# Patient Record
Sex: FEMALE | Race: WHITE
[De-identification: names, ages, dates, MRNs, and addresses within clinical notes are randomized per-mention and may not be internally consistent; named-entity substitution may affect disease eponyms.]

---

## 2017-04-14 ENCOUNTER — HOSPITAL ENCOUNTER (OUTPATIENT)
Dept: HOSPITAL 61 - PCVCCLINIC | Age: 72
Discharge: HOME | End: 2017-04-14
Attending: INTERNAL MEDICINE
Payer: MEDICARE

## 2017-04-14 DIAGNOSIS — I10: ICD-10-CM

## 2017-04-14 DIAGNOSIS — Z95.1: ICD-10-CM

## 2017-04-14 DIAGNOSIS — E78.5: ICD-10-CM

## 2017-04-14 DIAGNOSIS — I25.10: Primary | ICD-10-CM

## 2017-04-14 PROCEDURE — 80061 LIPID PANEL: CPT

## 2017-04-14 PROCEDURE — G0463 HOSPITAL OUTPT CLINIC VISIT: HCPCS

## 2017-04-14 PROCEDURE — 93005 ELECTROCARDIOGRAM TRACING: CPT

## 2017-10-06 ENCOUNTER — HOSPITAL ENCOUNTER (OUTPATIENT)
Dept: HOSPITAL 61 - PCVCCLINIC | Age: 72
Discharge: HOME | End: 2017-10-06
Attending: INTERNAL MEDICINE
Payer: MEDICARE

## 2017-10-06 DIAGNOSIS — I25.10: Primary | ICD-10-CM

## 2017-10-06 DIAGNOSIS — E78.5: ICD-10-CM

## 2017-10-06 DIAGNOSIS — Z79.899: ICD-10-CM

## 2017-10-06 DIAGNOSIS — I10: ICD-10-CM

## 2017-10-06 DIAGNOSIS — I65.23: ICD-10-CM

## 2017-10-06 PROCEDURE — 93005 ELECTROCARDIOGRAM TRACING: CPT

## 2017-10-06 PROCEDURE — 80061 LIPID PANEL: CPT

## 2017-10-06 PROCEDURE — G0463 HOSPITAL OUTPT CLINIC VISIT: HCPCS

## 2018-02-13 ENCOUNTER — HOSPITAL ENCOUNTER (OUTPATIENT)
Dept: HOSPITAL 35 - RAD | Age: 73
End: 2018-02-13
Attending: NURSE PRACTITIONER
Payer: COMMERCIAL

## 2018-02-13 DIAGNOSIS — E28.39: ICD-10-CM

## 2018-02-13 DIAGNOSIS — M85.88: ICD-10-CM

## 2018-02-13 DIAGNOSIS — Z78.0: ICD-10-CM

## 2018-02-13 DIAGNOSIS — Z12.31: Primary | ICD-10-CM

## 2018-04-10 ENCOUNTER — HOSPITAL ENCOUNTER (OUTPATIENT)
Dept: HOSPITAL 61 - PCVCCLINIC | Age: 73
Discharge: HOME | End: 2018-04-10
Attending: INTERNAL MEDICINE
Payer: MEDICARE

## 2018-04-10 DIAGNOSIS — I25.10: Primary | ICD-10-CM

## 2018-04-10 DIAGNOSIS — I10: ICD-10-CM

## 2018-04-10 DIAGNOSIS — I65.23: ICD-10-CM

## 2018-04-10 DIAGNOSIS — E78.5: ICD-10-CM

## 2018-04-10 DIAGNOSIS — Z79.899: ICD-10-CM

## 2018-04-10 DIAGNOSIS — Z79.82: ICD-10-CM

## 2018-04-10 PROCEDURE — 93005 ELECTROCARDIOGRAM TRACING: CPT

## 2018-10-31 ENCOUNTER — HOSPITAL ENCOUNTER (OUTPATIENT)
Dept: HOSPITAL 61 - PCVCIMAG | Age: 73
Discharge: HOME | End: 2018-10-31
Attending: INTERNAL MEDICINE
Payer: MEDICARE

## 2018-10-31 ENCOUNTER — HOSPITAL ENCOUNTER (INPATIENT)
Dept: HOSPITAL 35 - ER | Age: 73
LOS: 2 days | Discharge: HOME | DRG: 246 | End: 2018-11-02
Attending: INTERNAL MEDICINE | Admitting: INTERNAL MEDICINE
Payer: COMMERCIAL

## 2018-10-31 VITALS — BODY MASS INDEX: 24.75 KG/M2 | WEIGHT: 154 LBS | HEIGHT: 66 IN

## 2018-10-31 VITALS — DIASTOLIC BLOOD PRESSURE: 77 MMHG | SYSTOLIC BLOOD PRESSURE: 114 MMHG

## 2018-10-31 VITALS — SYSTOLIC BLOOD PRESSURE: 111 MMHG | DIASTOLIC BLOOD PRESSURE: 73 MMHG

## 2018-10-31 VITALS — SYSTOLIC BLOOD PRESSURE: 114 MMHG | DIASTOLIC BLOOD PRESSURE: 72 MMHG

## 2018-10-31 VITALS — SYSTOLIC BLOOD PRESSURE: 119 MMHG | DIASTOLIC BLOOD PRESSURE: 77 MMHG

## 2018-10-31 VITALS — SYSTOLIC BLOOD PRESSURE: 101 MMHG | DIASTOLIC BLOOD PRESSURE: 72 MMHG

## 2018-10-31 VITALS — SYSTOLIC BLOOD PRESSURE: 112 MMHG | DIASTOLIC BLOOD PRESSURE: 69 MMHG

## 2018-10-31 VITALS — DIASTOLIC BLOOD PRESSURE: 69 MMHG | SYSTOLIC BLOOD PRESSURE: 112 MMHG

## 2018-10-31 VITALS — DIASTOLIC BLOOD PRESSURE: 81 MMHG | SYSTOLIC BLOOD PRESSURE: 113 MMHG

## 2018-10-31 VITALS — DIASTOLIC BLOOD PRESSURE: 72 MMHG | SYSTOLIC BLOOD PRESSURE: 101 MMHG

## 2018-10-31 DIAGNOSIS — I25.10: ICD-10-CM

## 2018-10-31 DIAGNOSIS — I10: Primary | ICD-10-CM

## 2018-10-31 DIAGNOSIS — I65.29: ICD-10-CM

## 2018-10-31 DIAGNOSIS — Z82.49: ICD-10-CM

## 2018-10-31 DIAGNOSIS — Z90.710: ICD-10-CM

## 2018-10-31 DIAGNOSIS — Z95.1: ICD-10-CM

## 2018-10-31 DIAGNOSIS — Z88.2: ICD-10-CM

## 2018-10-31 DIAGNOSIS — I21.09: Primary | ICD-10-CM

## 2018-10-31 DIAGNOSIS — I25.5: ICD-10-CM

## 2018-10-31 DIAGNOSIS — I47.2: ICD-10-CM

## 2018-10-31 DIAGNOSIS — I11.0: ICD-10-CM

## 2018-10-31 DIAGNOSIS — E78.5: ICD-10-CM

## 2018-10-31 DIAGNOSIS — Z88.6: ICD-10-CM

## 2018-10-31 DIAGNOSIS — I50.21: ICD-10-CM

## 2018-10-31 DIAGNOSIS — Z88.8: ICD-10-CM

## 2018-10-31 LAB
ANION GAP SERPL CALC-SCNC: 14 MMOL/L (ref 7–16)
BUN SERPL-MCNC: 17 MG/DL (ref 7–18)
CALCIUM SERPL-MCNC: 9.6 MG/DL (ref 8.5–10.1)
CHLORIDE SERPL-SCNC: 108 MMOL/L (ref 98–107)
CO2 SERPL-SCNC: 19 MMOL/L (ref 21–32)
CREAT SERPL-MCNC: 0.8 MG/DL (ref 0.6–1)
ERYTHROCYTE [DISTWIDTH] IN BLOOD BY AUTOMATED COUNT: 13.3 % (ref 10.5–14.5)
GLUCOSE SERPL-MCNC: 119 MG/DL (ref 74–106)
HCT VFR BLD CALC: 38.8 % (ref 37–47)
HGB BLD-MCNC: 13.6 GM/DL (ref 12–15)
INR PPP: 1.1
MCH RBC QN AUTO: 30.3 PG (ref 26–34)
MCHC RBC AUTO-ENTMCNC: 35 G/DL (ref 28–37)
MCV RBC: 86.7 FL (ref 80–100)
PLATELET # BLD: 182 THOU/UL (ref 150–400)
POTASSIUM SERPL-SCNC: 3.9 MMOL/L (ref 3.5–5.1)
PROTHROMBIN TIME: 11.1 SECONDS (ref 9.3–11.4)
RBC # BLD AUTO: 4.47 MIL/UL (ref 4.2–5)
SODIUM SERPL-SCNC: 141 MMOL/L (ref 136–145)
TROPONIN I SERPL-MCNC: 1.03 NG/ML (ref ?–0.06)
WBC # BLD AUTO: 5.3 THOU/UL (ref 4–11)

## 2018-10-31 PROCEDURE — B215YZZ FLUOROSCOPY OF LEFT HEART USING OTHER CONTRAST: ICD-10-PCS | Performed by: INTERNAL MEDICINE

## 2018-10-31 PROCEDURE — B218YZZ FLUOROSCOPY OF LEFT INTERNAL MAMMARY BYPASS GRAFT USING OTHER CONTRAST: ICD-10-PCS | Performed by: INTERNAL MEDICINE

## 2018-10-31 PROCEDURE — 4A023N7 MEASUREMENT OF CARDIAC SAMPLING AND PRESSURE, LEFT HEART, PERCUTANEOUS APPROACH: ICD-10-PCS | Performed by: INTERNAL MEDICINE

## 2018-10-31 PROCEDURE — 93325 DOPPLER ECHO COLOR FLOW MAPG: CPT

## 2018-10-31 PROCEDURE — 027034Z DILATION OF CORONARY ARTERY, ONE ARTERY WITH DRUG-ELUTING INTRALUMINAL DEVICE, PERCUTANEOUS APPROACH: ICD-10-PCS | Performed by: INTERNAL MEDICINE

## 2018-10-31 PROCEDURE — 93351 STRESS TTE COMPLETE: CPT

## 2018-10-31 PROCEDURE — B211YZZ FLUOROSCOPY OF MULTIPLE CORONARY ARTERIES USING OTHER CONTRAST: ICD-10-PCS | Performed by: INTERNAL MEDICINE

## 2018-10-31 NOTE — PCVCIMAG
--------------- APPROVED REPORT --------------





Study performed:  10/31/2018 10:27:10



Exam:  Stress Echocardiogram

Indication: CAD s/p CABG,stent

Patient Location: Echo lab

Stress Nurse: Yuliya Leal RN

Room #: 2

Status: routine



Ht: 5 ft 6 in  

HR: 76 bpm      BP: 146/92 mmHg

Rhythm: Incomplete RBBB



Medical History

Medical History: CAD s/p CABG,stent, PAD

Cardiac Risk Factors: HTN, Hyperlipidemia

Previous Cardiac Procedures: CABG,PCI

Exercise History: Physically active



Procedure

The patient underwent an Exercise Stress Test using the Pantera 

Protocol. Blood pressure, heart rate, and EKG were monitored.

An Echocardiogram was performed by technician in four stages in quad 

fashion.  At peak stress, four selected images were obtained and 

placed side by side with resting images for comparison.



Stress Test Details

Stress Test:  Exercise stress testing was performed using a Pantera 

protocol.

HR

Resting HR:            76 bpmMax Heart Rate (APMHR): 149 bpm 

Max HR Achieved:  151 bpmTarget HR (85% APMHR): 126 bpm

% of APMHR:         101

Recovery HR:            105 bpm

HR response to stress: Normal HR response to stress



BP

Resting BP:  146/92 mmHg

Max BP:       182/86 mmHg

Recovery BP:       110/70 mmHg



ECG

Resting ECG:  Incomplete RBBB

Stress ECG:     Anterolateral injury pattern

ST Change: Strongly positive

Maximum ST Deviation: 4 mm

Arrhythmia:    Frequent PVCs,couplets ,short runs PVcs

Recovery ECG: Anterolateral MI pattern, Anteroseptal MI 

pattern

Recovery ST Change: Strongly positive

Recovery ST Deviation: 4.5 mm

Recovery Arrhythmia: Non-sustained ventricular tachycardia, 

Ventricular fibrillation



Clinical

Reason for Termination: Maximal effort

Stress Symptoms: Leg Fatigue

Exercise duration: 8 min 01 sec

Highest Stage Achieved: Stage 3: 3.4 mph at 14% grade. 

Exercise capacity: 10.1 METs

Overall Exercise Capacity for Age: Good

Scale: Active

Angina Score: None



Stress ECG Conclusion

The patient exercised according to the PANTERA protocol for 8:01 mins; 

achieving a work level of 10.1  METS. 

The resting heart rate of 78  bpm gildardo to a maximum heart rate of 157 

bpm. 

This value represent 105% of the maximal, age-predicted heart rate. 

The resting blood pressure of  146/92mmHg, gildardo to a maximum blood 

pressure of 182/86mmHg. 

The exercise test was stopped due to fatigue.

ST elevation which began as the exercise was stopped continued to 

progress.  Dr Jha was called to the room. Sublingual 

nitroglycerin x 2 was given. An IV was started.

Alfredo was called and patient was transported to the hospital.

[facility] Treadmill Score is -12.0 which is High risk.



Pre-Stress Echo

The resting Echocardiogram showed normal left ventricular 

contractility with an estimated Ejection Fraction of about 55-60%. 



Post-Stress Echo

The stress Echocardiogram showed abnormal left ventricular 

contractility with an estimated Ejection Fraction of about 45-50%. 

The stress Echocardiogram demonstrated wall motion abnormality in the 

basal anteroseptal, mid-anteroseptal, apical anterior 

walls.



Conclusion

Clinical Response:  Ischemic

Exercise Capacity:  Average

Stress ECG Response:  Ischemic

Stress Echo Images:  Ischemic

Positive stress test with acute changes to the EKG and images.



Other Information

Study Quality: Adequate



Critical Notification

Physician Notified 



&lt;Conclusion&gt;

Positive stress test with acute changes to the EKG and images.

## 2018-11-01 VITALS — SYSTOLIC BLOOD PRESSURE: 101 MMHG | DIASTOLIC BLOOD PRESSURE: 67 MMHG

## 2018-11-01 VITALS — DIASTOLIC BLOOD PRESSURE: 57 MMHG | SYSTOLIC BLOOD PRESSURE: 91 MMHG

## 2018-11-01 VITALS — SYSTOLIC BLOOD PRESSURE: 91 MMHG | DIASTOLIC BLOOD PRESSURE: 58 MMHG

## 2018-11-01 VITALS — DIASTOLIC BLOOD PRESSURE: 61 MMHG | SYSTOLIC BLOOD PRESSURE: 100 MMHG

## 2018-11-01 VITALS — DIASTOLIC BLOOD PRESSURE: 66 MMHG | SYSTOLIC BLOOD PRESSURE: 98 MMHG

## 2018-11-01 LAB
ALBUMIN SERPL-MCNC: 3.1 G/DL (ref 3.4–5)
ALT SERPL-CCNC: 24 U/L (ref 30–65)
ANION GAP SERPL CALC-SCNC: 12 MMOL/L (ref 7–16)
AST SERPL-CCNC: 50 U/L (ref 15–37)
BILIRUB SERPL-MCNC: 0.5 MG/DL
BUN SERPL-MCNC: 13 MG/DL (ref 7–18)
CALCIUM SERPL-MCNC: 9.1 MG/DL (ref 8.5–10.1)
CHLORIDE SERPL-SCNC: 109 MMOL/L (ref 98–107)
CHOLEST SERPL-MCNC: 151 MG/DL (ref ?–200)
CO2 SERPL-SCNC: 21 MMOL/L (ref 21–32)
CREAT SERPL-MCNC: 0.8 MG/DL (ref 0.6–1)
ERYTHROCYTE [DISTWIDTH] IN BLOOD BY AUTOMATED COUNT: 13.7 % (ref 10.5–14.5)
GLUCOSE SERPL-MCNC: 117 MG/DL (ref 74–106)
HCT VFR BLD CALC: 31.3 % (ref 37–47)
HDLC SERPL-MCNC: 45 MG/DL (ref 40–?)
HGB BLD-MCNC: 11.1 GM/DL (ref 12–15)
LDLC SERPL-MCNC: 82 MG/DL (ref ?–100)
MCH RBC QN AUTO: 30.8 PG (ref 26–34)
MCHC RBC AUTO-ENTMCNC: 35.4 G/DL (ref 28–37)
MCV RBC: 87 FL (ref 80–100)
PLATELET # BLD: 175 THOU/UL (ref 150–400)
POTASSIUM SERPL-SCNC: 3.7 MMOL/L (ref 3.5–5.1)
PROT SERPL-MCNC: 5.7 G/DL (ref 6.4–8.2)
RBC # BLD AUTO: 3.6 MIL/UL (ref 4.2–5)
SODIUM SERPL-SCNC: 142 MMOL/L (ref 136–145)
TC:HDL: 3.4 RATIO
TRIGL SERPL-MCNC: 121 MG/DL (ref ?–150)
TROPONIN I SERPL-MCNC: 10.81 NG/ML (ref ?–0.06)
VLDLC SERPL CALC-MCNC: 24 MG/DL (ref ?–40)
WBC # BLD AUTO: 10.3 THOU/UL (ref 4–11)

## 2018-11-02 VITALS — DIASTOLIC BLOOD PRESSURE: 60 MMHG | SYSTOLIC BLOOD PRESSURE: 98 MMHG

## 2018-11-02 VITALS — SYSTOLIC BLOOD PRESSURE: 98 MMHG | DIASTOLIC BLOOD PRESSURE: 60 MMHG

## 2018-11-09 ENCOUNTER — HOSPITAL ENCOUNTER (OUTPATIENT)
Dept: HOSPITAL 61 - PCVCCLINIC | Age: 73
Discharge: HOME | End: 2018-11-09
Attending: INTERNAL MEDICINE
Payer: MEDICARE

## 2018-11-09 DIAGNOSIS — I25.10: Primary | ICD-10-CM

## 2018-11-09 DIAGNOSIS — I10: ICD-10-CM

## 2018-11-09 DIAGNOSIS — Z95.1: ICD-10-CM

## 2018-11-09 DIAGNOSIS — Z79.82: ICD-10-CM

## 2018-11-09 DIAGNOSIS — E78.5: ICD-10-CM

## 2018-11-09 DIAGNOSIS — I25.5: ICD-10-CM

## 2018-11-09 DIAGNOSIS — I65.23: ICD-10-CM

## 2018-11-09 PROCEDURE — G0463 HOSPITAL OUTPT CLINIC VISIT: HCPCS

## 2018-11-09 PROCEDURE — 93005 ELECTROCARDIOGRAM TRACING: CPT

## 2018-11-29 ENCOUNTER — HOSPITAL ENCOUNTER (EMERGENCY)
Dept: HOSPITAL 35 - ER | Age: 73
Discharge: HOME | End: 2018-11-29
Payer: COMMERCIAL

## 2018-11-29 VITALS — HEIGHT: 66 IN | WEIGHT: 150 LBS | BODY MASS INDEX: 24.11 KG/M2

## 2018-11-29 VITALS — DIASTOLIC BLOOD PRESSURE: 63 MMHG | SYSTOLIC BLOOD PRESSURE: 121 MMHG

## 2018-11-29 DIAGNOSIS — Z95.5: ICD-10-CM

## 2018-11-29 DIAGNOSIS — Z88.5: ICD-10-CM

## 2018-11-29 DIAGNOSIS — Z90.710: ICD-10-CM

## 2018-11-29 DIAGNOSIS — Z88.2: ICD-10-CM

## 2018-11-29 DIAGNOSIS — Z85.3: ICD-10-CM

## 2018-11-29 DIAGNOSIS — Z90.12: ICD-10-CM

## 2018-11-29 DIAGNOSIS — Z88.1: ICD-10-CM

## 2018-11-29 DIAGNOSIS — I10: ICD-10-CM

## 2018-11-29 DIAGNOSIS — R07.89: Primary | ICD-10-CM

## 2018-11-29 LAB
ANION GAP SERPL CALC-SCNC: 10 MMOL/L (ref 7–16)
BASOPHILS NFR BLD AUTO: 0.6 % (ref 0–2)
BUN SERPL-MCNC: 13 MG/DL (ref 7–18)
CALCIUM SERPL-MCNC: 10.2 MG/DL (ref 8.5–10.1)
CHLORIDE SERPL-SCNC: 102 MMOL/L (ref 98–107)
CO2 SERPL-SCNC: 26 MMOL/L (ref 21–32)
CREAT SERPL-MCNC: 0.9 MG/DL (ref 0.6–1)
EOSINOPHIL NFR BLD: 3.2 % (ref 0–3)
ERYTHROCYTE [DISTWIDTH] IN BLOOD BY AUTOMATED COUNT: 14.6 % (ref 10.5–14.5)
GLUCOSE SERPL-MCNC: 108 MG/DL (ref 74–106)
GRANULOCYTES NFR BLD MANUAL: 67.9 % (ref 36–66)
HCT VFR BLD CALC: 40.6 % (ref 37–47)
HGB BLD-MCNC: 13.6 GM/DL (ref 12–15)
LYMPHOCYTES NFR BLD AUTO: 20.9 % (ref 24–44)
MCH RBC QN AUTO: 29.7 PG (ref 26–34)
MCHC RBC AUTO-ENTMCNC: 33.4 G/DL (ref 28–37)
MCV RBC: 89.1 FL (ref 80–100)
MONOCYTES NFR BLD: 7.4 % (ref 1–8)
NEUTROPHILS # BLD: 4.4 THOU/UL (ref 1.4–8.2)
PLATELET # BLD: 163 THOU/UL (ref 150–400)
POTASSIUM SERPL-SCNC: 3.5 MMOL/L (ref 3.5–5.1)
RBC # BLD AUTO: 4.56 MIL/UL (ref 4.2–5)
SODIUM SERPL-SCNC: 138 MMOL/L (ref 136–145)
TROPONIN I SERPL-MCNC: <0.06 NG/ML (ref ?–0.06)
WBC # BLD AUTO: 6.4 THOU/UL (ref 4–11)

## 2018-12-21 ENCOUNTER — HOSPITAL ENCOUNTER (OUTPATIENT)
Dept: HOSPITAL 61 - PCVCCLINIC | Age: 73
Discharge: HOME | End: 2018-12-21
Attending: INTERNAL MEDICINE
Payer: MEDICARE

## 2018-12-21 DIAGNOSIS — I25.5: ICD-10-CM

## 2018-12-21 DIAGNOSIS — I10: ICD-10-CM

## 2018-12-21 DIAGNOSIS — I25.10: Primary | ICD-10-CM

## 2018-12-21 DIAGNOSIS — Z79.899: ICD-10-CM

## 2018-12-21 DIAGNOSIS — Z79.82: ICD-10-CM

## 2018-12-21 DIAGNOSIS — Z95.1: ICD-10-CM

## 2018-12-21 DIAGNOSIS — I65.23: ICD-10-CM

## 2018-12-21 DIAGNOSIS — Z88.5: ICD-10-CM

## 2018-12-21 DIAGNOSIS — E78.5: ICD-10-CM

## 2018-12-21 DIAGNOSIS — I25.2: ICD-10-CM

## 2018-12-21 DIAGNOSIS — Z88.8: ICD-10-CM

## 2018-12-21 DIAGNOSIS — Z90.710: ICD-10-CM

## 2018-12-21 DIAGNOSIS — Z88.2: ICD-10-CM

## 2018-12-21 PROCEDURE — G0463 HOSPITAL OUTPT CLINIC VISIT: HCPCS

## 2018-12-21 PROCEDURE — 93005 ELECTROCARDIOGRAM TRACING: CPT

## 2019-02-22 ENCOUNTER — HOSPITAL ENCOUNTER (OUTPATIENT)
Dept: HOSPITAL 61 - PCVCIMAG | Age: 74
Discharge: HOME | End: 2019-02-22
Attending: INTERNAL MEDICINE
Payer: MEDICARE

## 2019-02-22 DIAGNOSIS — E78.5: ICD-10-CM

## 2019-02-22 DIAGNOSIS — Z79.82: ICD-10-CM

## 2019-02-22 DIAGNOSIS — I10: ICD-10-CM

## 2019-02-22 DIAGNOSIS — I25.10: ICD-10-CM

## 2019-02-22 DIAGNOSIS — I08.3: Primary | ICD-10-CM

## 2019-02-22 DIAGNOSIS — Z95.1: ICD-10-CM

## 2019-02-22 PROCEDURE — 80061 LIPID PANEL: CPT

## 2019-02-22 PROCEDURE — G0463 HOSPITAL OUTPT CLINIC VISIT: HCPCS

## 2019-02-22 PROCEDURE — 93306 TTE W/DOPPLER COMPLETE: CPT

## 2019-02-22 PROCEDURE — 36415 COLL VENOUS BLD VENIPUNCTURE: CPT

## 2019-02-22 NOTE — PCVCIMAG
--------------- APPROVED REPORT --------------





Study performed:  02/22/2019 13:15:44



EXAM: Comprehensive 2D, Doppler, and color-flow 

Echocardiogram

Patient Location: Echo lab

Room #:  2Status:  routine



BSA:         1.80

HR: 70 bpmBP:          140/82 mmHg

Rhythm: NSR



Other Information 

Study Quality: Good



Risk Factors: 

Cardiac Risk Factors:  Hyperlipidemia, 

HTN



Indications

CAD

Hypertension/HDD

HX CABG, MI



2D Dimensions

IVSd:  5.10 (7-11mm)LVOT Diam:  21.78 (18-24mm) 

LVDd:  46.59 mm

PWd:  7.24 (7-11mm)Ascending Ao:  24.53 (22-36mm)

LVDs:  27.75 (25-40mm)

Left Atrium:  29.07 (27-40mm)

Aortic Root:  25.48 mm

LV Single Plane 4CH:  51.66 %

LV Single Plane 2CH:  61.83 %

Biplane EF:  69.0 %



Volumes

Left Atrial Volume (Systole)

Single Plane 4CH:  42.37 mLSingle Plane 2CH:  49.07 mL

Biplane LA Volume:  47.00 mLLA ESV Index:  26.00 mL/m2



Aortic Valve

AoV Peak Luis Fernando.:  1.05 m/s

AO Peak Gr.:  4.86 mmHgLVOT Max PG:  3.53 mmHg

LVOT Max V:  0.94 m/s

YVONNE Vmax: 3.34 cm2

AI Vmax:  1.63 m/s

AI Livingston:  1.00 m/s2

AI PHT:  473.20 ms



Mitral Valve

E/A Ratio:  0.6

MV Decel. Time:  167.97 ms

MV E Max Luis Fernando.:  0.45 m/s

MV A Luis Fernando.:  0.72 m/s

IVRT:  110.73 ms



Pulmonary Valve

PV Peak Luis Fernando.:  0.90 m/sPV Peak Gr.:  3.21 mmHg



Tricuspid Valve

TR Peak Luis Fernando.:  2.45 m/s

TR Peak Gr.:  23.98 mmHg

TV Vmax:  0.56 m/sPA Pressure:  30.00 mmHg



Left Ventricle

The left ventricle is normal size. There is normal LV segmental wall 

motion. There is normal left ventricular wall thickness. Left 

ventricular systolic function is normal. The left ventricular 

ejection fraction is within the normal range. LVEF is 65%. Mild 

diastolic dysfunction is present (impaired relaxation 

pattern).



Right Ventricle

The right ventricle is normal size. The right ventricular systolic 

function is normal.



Atria

The left atrium size is normal. The right atrium size is 

normal.



Aortic Valve

Aortic valve is trileaflet, mildly sclerotic Mild aortic 

regurgitation. There is no aortic valvular stenosis.



Mitral Valve

The mitral valve is normal in structure. Mild mitral regurgitation. 

No evidence of mitral valve stenosis.



Tricuspid Valve

The tricuspid valve is normal in structure. Mild to moderate 

tricuspid regurgitation with a PA pressure of 30.



Pulmonic Valve

The pulmonary valve is normal in structure. Trace to mild pulmonic 

regurgitation.



Great Vessels

The aortic root is normal in size. The ascending aorta is normal in 

size. Aortic arch is normal in caliber. IVC is normal in size and 

collapses >50% with inspiration.



Pericardium

There is no pericardial effusion. There is no pleural 

effusion.



<Conclusion>

Left ventricular systolic function is normal.

There is normal LV segmental wall motion.

LVEF is 65%. Mild diastolic dysfunction

Aortic valve is trileaflet, mildly sclerotic. Mild aortic 

regurgitation.

The mitral valve is normal in structure. Mild mitral 

regurgitation.

Mild to moderate tricuspid regurgitation with a pulmonary artery 

pressure of 30mmHg.

There is no pericardial effusion.

## 2019-03-12 ENCOUNTER — HOSPITAL ENCOUNTER (OUTPATIENT)
Dept: HOSPITAL 35 - RAD | Age: 74
End: 2019-03-12
Attending: NURSE PRACTITIONER
Payer: COMMERCIAL

## 2019-03-12 DIAGNOSIS — Z92.3: ICD-10-CM

## 2019-03-12 DIAGNOSIS — Z12.31: Primary | ICD-10-CM

## 2019-03-12 DIAGNOSIS — Z98.890: ICD-10-CM

## 2019-03-12 DIAGNOSIS — Z85.3: ICD-10-CM

## 2019-06-06 ENCOUNTER — HOSPITAL ENCOUNTER (OUTPATIENT)
Dept: HOSPITAL 61 - PCVCCLINIC | Age: 74
Discharge: HOME | End: 2019-06-06
Attending: INTERNAL MEDICINE
Payer: MEDICARE

## 2019-06-06 DIAGNOSIS — Z88.2: ICD-10-CM

## 2019-06-06 DIAGNOSIS — Z91.041: ICD-10-CM

## 2019-06-06 DIAGNOSIS — E78.2: ICD-10-CM

## 2019-06-06 DIAGNOSIS — I10: ICD-10-CM

## 2019-06-06 DIAGNOSIS — Z88.6: ICD-10-CM

## 2019-06-06 DIAGNOSIS — Z88.3: ICD-10-CM

## 2019-06-06 DIAGNOSIS — I25.10: Primary | ICD-10-CM

## 2019-06-06 DIAGNOSIS — Z95.1: ICD-10-CM

## 2019-06-06 PROCEDURE — G0463 HOSPITAL OUTPT CLINIC VISIT: HCPCS

## 2019-06-06 PROCEDURE — 93005 ELECTROCARDIOGRAM TRACING: CPT

## 2019-08-27 ENCOUNTER — HOSPITAL ENCOUNTER (OUTPATIENT)
Dept: HOSPITAL 61 - PCVCCLINIC | Age: 74
Discharge: HOME | End: 2019-08-27
Attending: INTERNAL MEDICINE
Payer: MEDICARE

## 2019-08-27 DIAGNOSIS — E78.5: ICD-10-CM

## 2019-08-27 DIAGNOSIS — Z88.5: ICD-10-CM

## 2019-08-27 DIAGNOSIS — I10: ICD-10-CM

## 2019-08-27 DIAGNOSIS — Z79.82: ICD-10-CM

## 2019-08-27 DIAGNOSIS — I25.10: Primary | ICD-10-CM

## 2019-08-27 DIAGNOSIS — Z95.1: ICD-10-CM

## 2019-08-27 PROCEDURE — 80061 LIPID PANEL: CPT

## 2019-08-27 PROCEDURE — 93005 ELECTROCARDIOGRAM TRACING: CPT

## 2019-08-27 PROCEDURE — 36415 COLL VENOUS BLD VENIPUNCTURE: CPT

## 2019-08-27 PROCEDURE — G0463 HOSPITAL OUTPT CLINIC VISIT: HCPCS

## 2019-09-11 ENCOUNTER — HOSPITAL ENCOUNTER (OUTPATIENT)
Dept: HOSPITAL 35 - PUL | Age: 74
End: 2019-09-11
Attending: FAMILY MEDICINE
Payer: COMMERCIAL

## 2019-09-11 DIAGNOSIS — Z79.899: ICD-10-CM

## 2019-09-11 DIAGNOSIS — R06.00: Primary | ICD-10-CM

## 2020-03-03 ENCOUNTER — HOSPITAL ENCOUNTER (OUTPATIENT)
Dept: HOSPITAL 35 - SJCVC | Age: 75
End: 2020-03-03
Attending: INTERNAL MEDICINE
Payer: COMMERCIAL

## 2020-03-03 DIAGNOSIS — I25.10: ICD-10-CM

## 2020-03-03 DIAGNOSIS — E03.9: ICD-10-CM

## 2020-03-03 DIAGNOSIS — F41.9: ICD-10-CM

## 2020-03-03 DIAGNOSIS — I25.2: ICD-10-CM

## 2020-03-03 DIAGNOSIS — Z79.82: ICD-10-CM

## 2020-03-03 DIAGNOSIS — E78.5: ICD-10-CM

## 2020-03-03 DIAGNOSIS — I10: ICD-10-CM

## 2020-03-03 DIAGNOSIS — Z79.899: ICD-10-CM

## 2020-03-03 DIAGNOSIS — E78.00: ICD-10-CM

## 2020-03-03 DIAGNOSIS — I45.10: Primary | ICD-10-CM

## 2020-03-03 DIAGNOSIS — Z95.1: ICD-10-CM

## 2020-03-03 DIAGNOSIS — Z90.710: ICD-10-CM

## 2020-03-03 DIAGNOSIS — Z90.49: ICD-10-CM

## 2020-05-27 ENCOUNTER — HOSPITAL ENCOUNTER (OUTPATIENT)
Dept: HOSPITAL 35 - ULTRA | Age: 75
End: 2020-05-27
Attending: NURSE PRACTITIONER
Payer: COMMERCIAL

## 2020-05-27 DIAGNOSIS — Z12.31: Primary | ICD-10-CM

## 2020-05-27 DIAGNOSIS — N64.89: ICD-10-CM

## 2020-09-03 ENCOUNTER — HOSPITAL ENCOUNTER (OUTPATIENT)
Dept: HOSPITAL 35 - SJCVCIMAG | Age: 75
End: 2020-09-03
Attending: INTERNAL MEDICINE
Payer: COMMERCIAL

## 2020-09-03 DIAGNOSIS — I25.10: Primary | ICD-10-CM

## 2020-09-03 DIAGNOSIS — R00.0: ICD-10-CM

## 2020-09-03 DIAGNOSIS — E78.5: ICD-10-CM

## 2020-09-03 DIAGNOSIS — I25.2: ICD-10-CM

## 2020-09-03 DIAGNOSIS — I10: ICD-10-CM

## 2020-09-03 DIAGNOSIS — Z79.899: ICD-10-CM

## 2020-09-03 DIAGNOSIS — Z95.1: ICD-10-CM

## 2020-09-03 DIAGNOSIS — Z95.5: ICD-10-CM

## 2021-03-16 ENCOUNTER — HOSPITAL ENCOUNTER (OUTPATIENT)
Dept: HOSPITAL 35 - SJCVC | Age: 76
End: 2021-03-16
Attending: INTERNAL MEDICINE
Payer: COMMERCIAL

## 2021-03-16 DIAGNOSIS — Z79.82: ICD-10-CM

## 2021-03-16 DIAGNOSIS — Z79.899: ICD-10-CM

## 2021-03-16 DIAGNOSIS — R94.31: ICD-10-CM

## 2021-03-16 DIAGNOSIS — Z88.5: ICD-10-CM

## 2021-03-16 DIAGNOSIS — I45.10: Primary | ICD-10-CM

## 2021-03-16 DIAGNOSIS — Z88.8: ICD-10-CM

## 2021-03-16 DIAGNOSIS — I50.21: ICD-10-CM

## 2021-03-16 DIAGNOSIS — I25.10: ICD-10-CM

## 2021-03-16 DIAGNOSIS — Z88.2: ICD-10-CM

## 2021-03-16 DIAGNOSIS — Z95.1: ICD-10-CM

## 2021-03-16 DIAGNOSIS — I65.23: ICD-10-CM

## 2021-03-16 DIAGNOSIS — I11.0: ICD-10-CM

## 2021-03-16 DIAGNOSIS — E78.5: ICD-10-CM

## 2021-06-08 ENCOUNTER — HOSPITAL ENCOUNTER (OUTPATIENT)
Dept: HOSPITAL 35 - CAT | Age: 76
End: 2021-06-08
Attending: FAMILY MEDICINE
Payer: COMMERCIAL

## 2021-06-08 DIAGNOSIS — R10.84: ICD-10-CM

## 2021-06-08 DIAGNOSIS — K57.30: Primary | ICD-10-CM

## 2021-06-11 ENCOUNTER — HOSPITAL ENCOUNTER (OUTPATIENT)
Dept: HOSPITAL 35 - BC | Age: 76
End: 2021-06-11
Attending: NURSE PRACTITIONER
Payer: COMMERCIAL

## 2021-06-11 DIAGNOSIS — Z12.31: Primary | ICD-10-CM

## 2021-06-11 DIAGNOSIS — N64.89: ICD-10-CM

## 2021-09-22 ENCOUNTER — HOSPITAL ENCOUNTER (OUTPATIENT)
Dept: HOSPITAL 35 - SJCVCIMAG | Age: 76
End: 2021-09-22
Attending: INTERNAL MEDICINE
Payer: COMMERCIAL

## 2021-09-22 DIAGNOSIS — I45.10: Primary | ICD-10-CM

## 2021-09-22 DIAGNOSIS — I25.10: ICD-10-CM

## 2021-09-22 DIAGNOSIS — Z88.5: ICD-10-CM

## 2021-09-22 DIAGNOSIS — I50.21: ICD-10-CM

## 2021-09-22 DIAGNOSIS — E78.5: ICD-10-CM

## 2021-09-22 DIAGNOSIS — Z79.82: ICD-10-CM

## 2021-09-22 DIAGNOSIS — I48.0: ICD-10-CM

## 2021-09-22 DIAGNOSIS — Z88.8: ICD-10-CM

## 2021-09-22 DIAGNOSIS — I65.23: ICD-10-CM

## 2021-09-22 DIAGNOSIS — Z79.899: ICD-10-CM

## 2021-09-22 DIAGNOSIS — Z95.1: ICD-10-CM

## 2021-09-22 DIAGNOSIS — Z88.2: ICD-10-CM

## 2021-09-22 DIAGNOSIS — I11.0: ICD-10-CM
